# Patient Record
Sex: MALE | Race: WHITE
[De-identification: names, ages, dates, MRNs, and addresses within clinical notes are randomized per-mention and may not be internally consistent; named-entity substitution may affect disease eponyms.]

---

## 2021-12-27 PROBLEM — Z00.00 ENCOUNTER FOR PREVENTIVE HEALTH EXAMINATION: Status: ACTIVE | Noted: 2021-12-27

## 2022-01-04 ENCOUNTER — NON-APPOINTMENT (OUTPATIENT)
Age: 67
End: 2022-01-04

## 2022-01-05 ENCOUNTER — APPOINTMENT (OUTPATIENT)
Dept: HEART AND VASCULAR | Facility: CLINIC | Age: 67
End: 2022-01-05
Payer: COMMERCIAL

## 2022-01-05 ENCOUNTER — NON-APPOINTMENT (OUTPATIENT)
Age: 67
End: 2022-01-05

## 2022-01-05 VITALS
HEIGHT: 72 IN | HEART RATE: 60 BPM | DIASTOLIC BLOOD PRESSURE: 80 MMHG | RESPIRATION RATE: 12 BRPM | BODY MASS INDEX: 28.31 KG/M2 | WEIGHT: 209 LBS | SYSTOLIC BLOOD PRESSURE: 108 MMHG

## 2022-01-05 DIAGNOSIS — R06.02 SHORTNESS OF BREATH: ICD-10-CM

## 2022-01-05 DIAGNOSIS — Z87.19 PERSONAL HISTORY OF OTHER DISEASES OF THE DIGESTIVE SYSTEM: ICD-10-CM

## 2022-01-05 DIAGNOSIS — Z78.9 OTHER SPECIFIED HEALTH STATUS: ICD-10-CM

## 2022-01-05 DIAGNOSIS — Z82.49 FAMILY HISTORY OF ISCHEMIC HEART DISEASE AND OTHER DISEASES OF THE CIRCULATORY SYSTEM: ICD-10-CM

## 2022-01-05 LAB — INR PPP: 1.3 RATIO

## 2022-01-05 PROCEDURE — 93306 TTE W/DOPPLER COMPLETE: CPT

## 2022-01-05 PROCEDURE — 93000 ELECTROCARDIOGRAM COMPLETE: CPT

## 2022-01-05 PROCEDURE — 93880 EXTRACRANIAL BILAT STUDY: CPT

## 2022-01-05 PROCEDURE — ZZZZZ: CPT

## 2022-01-05 PROCEDURE — 99204 OFFICE O/P NEW MOD 45 MIN: CPT | Mod: 25

## 2022-01-05 RX ORDER — AZATHIOPRINE 50 1/1
50 TABLET ORAL
Refills: 0 | Status: DISCONTINUED | COMMUNITY

## 2022-01-05 RX ORDER — METOPROLOL TARTRATE 50 MG/1
50 TABLET, FILM COATED ORAL
Refills: 0 | Status: DISCONTINUED | COMMUNITY

## 2022-01-05 RX ORDER — TACROLIMUS 0.5 MG/1
0.5 CAPSULE ORAL
Refills: 0 | Status: DISCONTINUED | COMMUNITY

## 2022-01-05 NOTE — HISTORY OF PRESENT ILLNESS
[FreeTextEntry1] : 66-year-old male with a past medical history of Osborne status post liver transplant 2004, atrial fibrillation status post ablation comes in to establish care. Overall, feels at baseline denies any chest pain any orthopnea. Does have occasional shortness of breath however does not engage in exercise much.

## 2022-01-05 NOTE — DISCUSSION/SUMMARY
[FreeTextEntry1] : 1. Atrial fibrillation: Patient has had escalating doses of Coumadin without much in the way of an increase in INR as of late. Dosage adjusted Will obtain INR today and advise once results are known. Discussed switching to NOAC specifically eliquis 2.5 mg b.i.d. patient to followup with transplant team to see if compatible with liver transplant. EKG.\par 2. Shortness of breath: Echocardiogram\par 3. Carotid bruit: Carotid duplex\par \par inr subtherapeutic, quick abd us reveals pockets of ascites. likely affecting absorbtion of meds.

## 2022-01-05 NOTE — PHYSICAL EXAM
[Well Developed] : well developed [Well Nourished] : well nourished [No Acute Distress] : no acute distress [Normal Conjunctiva] : normal conjunctiva [Normal Venous Pressure] : normal venous pressure [Carotid Bruit] : carotid bruit [Normal S1, S2] : normal S1, S2 [No Rub] : no rub [No Gallop] : no gallop [Murmur] : murmur [Good Air Entry] : good air entry [No Respiratory Distress] : no respiratory distress  [Soft] : abdomen soft [Non Tender] : non-tender [No Masses/organomegaly] : no masses/organomegaly [Normal Bowel Sounds] : normal bowel sounds [Normal Gait] : normal gait [No Cyanosis] : no cyanosis [No Clubbing] : no clubbing [No Varicosities] : no varicosities [No Rash] : no rash [No Skin Lesions] : no skin lesions [Moves all extremities] : moves all extremities [No Focal Deficits] : no focal deficits [Normal Speech] : normal speech [Alert and Oriented] : alert and oriented [Normal memory] : normal memory [de-identified] : bismark [de-identified] : bibasilar insp crackles [de-identified] : large panus [de-identified] : 1+ b/l le edema

## 2022-01-13 ENCOUNTER — APPOINTMENT (OUTPATIENT)
Dept: HEART AND VASCULAR | Facility: CLINIC | Age: 67
End: 2022-01-13
Payer: COMMERCIAL

## 2022-01-13 ENCOUNTER — LABORATORY RESULT (OUTPATIENT)
Age: 67
End: 2022-01-13

## 2022-01-13 ENCOUNTER — TRANSCRIPTION ENCOUNTER (OUTPATIENT)
Age: 67
End: 2022-01-13

## 2022-01-13 DIAGNOSIS — R09.89 OTHER SPECIFIED SYMPTOMS AND SIGNS INVOLVING THE CIRCULATORY AND RESPIRATORY SYSTEMS: ICD-10-CM

## 2022-01-13 LAB — INR PPP: 2.6 RATIO

## 2022-01-13 PROCEDURE — 85610 PROTHROMBIN TIME: CPT | Mod: QW

## 2022-01-17 LAB
ALBUMIN SERPL ELPH-MCNC: 3.3 G/DL
ALP BLD-CCNC: 146 U/L
ALT SERPL-CCNC: 14 U/L
ANION GAP SERPL CALC-SCNC: 13 MMOL/L
AST SERPL-CCNC: 23 U/L
BASOPHILS # BLD AUTO: 0 K/UL
BASOPHILS NFR BLD AUTO: 0 %
BILIRUB SERPL-MCNC: 0.4 MG/DL
BUN SERPL-MCNC: 42 MG/DL
CALCIUM SERPL-MCNC: 8.6 MG/DL
CHLORIDE SERPL-SCNC: 108 MMOL/L
CHOLEST SERPL-MCNC: 107 MG/DL
CO2 SERPL-SCNC: 20 MMOL/L
CREAT SERPL-MCNC: 2.97 MG/DL
EOSINOPHIL # BLD AUTO: 0.05 K/UL
EOSINOPHIL NFR BLD AUTO: 0.9 %
ESTIMATED AVERAGE GLUCOSE: 94 MG/DL
FOLATE SERPL-MCNC: 7.3 NG/ML
GGT SERPL-CCNC: 65 U/L
GLUCOSE SERPL-MCNC: 97 MG/DL
HBA1C MFR BLD HPLC: 4.9 %
HCT VFR BLD CALC: 30.1 %
HDLC SERPL-MCNC: 39 MG/DL
HGB BLD-MCNC: 9.4 G/DL
LDLC SERPL CALC-MCNC: 46 MG/DL
LYMPHOCYTES # BLD AUTO: 0.5 K/UL
LYMPHOCYTES NFR BLD AUTO: 8.7 %
MAGNESIUM SERPL-MCNC: 1.6 MG/DL
MAN DIFF?: NORMAL
MCHC RBC-ENTMCNC: 31.2 GM/DL
MCHC RBC-ENTMCNC: 36.6 PG
MCV RBC AUTO: 117.1 FL
MONOCYTES # BLD AUTO: 0.25 K/UL
MONOCYTES NFR BLD AUTO: 4.3 %
NEUTROPHILS # BLD AUTO: 4.93 K/UL
NEUTROPHILS NFR BLD AUTO: 85.2 %
NONHDLC SERPL-MCNC: 68 MG/DL
PHOSPHATE SERPL-MCNC: 2.7 MG/DL
PLATELET # BLD AUTO: 176 K/UL
POTASSIUM SERPL-SCNC: 4 MMOL/L
PROT SERPL-MCNC: 6.1 G/DL
RBC # BLD: 2.57 M/UL
RBC # FLD: 17.3 %
SODIUM SERPL-SCNC: 141 MMOL/L
TACROLIMUS SERPL-MCNC: 6.3 NG/ML
TRIGL SERPL-MCNC: 110 MG/DL
VIT B12 SERPL-MCNC: 310 PG/ML
WBC # FLD AUTO: 5.79 K/UL

## 2022-01-26 ENCOUNTER — TRANSCRIPTION ENCOUNTER (OUTPATIENT)
Age: 67
End: 2022-01-26

## 2022-01-26 LAB
INR PPP: 2.49
PT BLD: 28.6

## 2022-02-02 ENCOUNTER — APPOINTMENT (OUTPATIENT)
Dept: HEART AND VASCULAR | Facility: CLINIC | Age: 67
End: 2022-02-02
Payer: COMMERCIAL

## 2022-02-02 PROCEDURE — 99213 OFFICE O/P EST LOW 20 MIN: CPT | Mod: 95

## 2022-02-02 NOTE — PHYSICAL EXAM
[Normal] : normal venous pressure, no carotid bruit [de-identified] : Unable to obtain [de-identified] : Unable to obtain [de-identified] : 1+ b/l le edema

## 2022-02-02 NOTE — DISCUSSION/SUMMARY
[FreeTextEntry1] : 1. Edema: Strongly encouraged to decrease torsemide to no more than once a day and considering in marked improvement in lower extremity edema to decrease it even further to 3 times a week and to obtain stat renal function labs. Patient has a prescription and will attempt to find a local lab to get blood work prescription for torsemide 20 mg daily  with the advice to only take every other day until after blood work results are reviewed.\par 2. Chronic renal insufficiency: Baseline creatinine 2.7-2.9. Advised to follow up with renal\par 3. Atrial fibrillation: Continue Coumadin and obtain INR.

## 2022-02-02 NOTE — HISTORY OF PRESENT ILLNESS
[FreeTextEntry1] : 66-year-old male for past medical history of renal failure status post renal transplant recipient.TH visit obtained as patient is in [place] on vacation. As per patient's wife and patient his lower family began to swell about one week ago. Patient found torsemide he had and began taking 3 times a day with robust urine outputs and significant decrease in bilateral lower extremity edema. One week prior to leaving on vacation patient's INR was 2.5. Currently patient denies any chest pain shortness of breath PND or orthopnea.

## 2022-02-08 ENCOUNTER — TRANSCRIPTION ENCOUNTER (OUTPATIENT)
Age: 67
End: 2022-02-08

## 2022-02-25 LAB — INR PPP: 3.52

## 2022-03-04 ENCOUNTER — RX RENEWAL (OUTPATIENT)
Age: 67
End: 2022-03-04

## 2022-03-14 LAB — INR PPP: 1.86

## 2022-03-22 LAB — INR PPP: 1.66

## 2022-04-05 LAB
INR PPP: 1.85
PT BLD: 21.2

## 2022-04-21 LAB
INR PPP: 4.5
PT BLD: 45

## 2022-04-26 ENCOUNTER — TRANSCRIPTION ENCOUNTER (OUTPATIENT)
Age: 67
End: 2022-04-26

## 2022-05-02 ENCOUNTER — NON-APPOINTMENT (OUTPATIENT)
Age: 67
End: 2022-05-02

## 2022-05-04 ENCOUNTER — APPOINTMENT (OUTPATIENT)
Dept: HEART AND VASCULAR | Facility: CLINIC | Age: 67
End: 2022-05-04
Payer: COMMERCIAL

## 2022-05-04 PROCEDURE — 99213 OFFICE O/P EST LOW 20 MIN: CPT | Mod: 95

## 2022-05-04 NOTE — HISTORY OF PRESENT ILLNESS
[Home] : at home, [unfilled] , at the time of the visit. [Medical Office: ([facility] [place])___] : at the medical office located in  [Spouse] : spouse [Verbal consent obtained from patient] : the patient, [unfilled] [FreeTextEntry1] : 67-year-old male with chronic renal failure atrial fibrillation anticoagulation. TH visit obtained to discuss recent hospitalization for hypokalemia. Patient was referred to the hospital for IV supplementation of potassium for low potassium despite oral replacement. Overall, feels well denies chest pain shortness of breath PND orthopnea. Of note, patient tested positive for corona virus shortly after discharge from the hospital.

## 2022-05-04 NOTE — DISCUSSION/SUMMARY
[FreeTextEntry1] : 1. Hypokalemia: Recent bloodwork reviewed potassium up to 3.2 creatinine 3.4 which is his baseline. Advised to continue p.o. KCl 20 mEq daily along with 400 mg of magnesium. Will repeat blood work in one week and advised with results unknown.\par 2. Atrial fibrillation: Patient with supratherapeutic INR after adjusting medication repeat INR 2.2 advised to continue current regimen of Coumadin

## 2022-05-11 ENCOUNTER — APPOINTMENT (OUTPATIENT)
Dept: HEART AND VASCULAR | Facility: CLINIC | Age: 67
End: 2022-05-11
Payer: COMMERCIAL

## 2022-05-11 DIAGNOSIS — R05.9 COUGH, UNSPECIFIED: ICD-10-CM

## 2022-05-11 PROCEDURE — 99213 OFFICE O/P EST LOW 20 MIN: CPT | Mod: 95

## 2022-05-11 NOTE — REASON FOR VISIT
[Home] : at home, [unfilled] , at the time of the visit. [Medical Office: ([facility])___] : at the medical office located in  [Spouse] : spouse [Verbal consent obtained from patient] : the patient, [unfilled] [Other: ____] : [unfilled] [FreeTextEntry1] : 68 y/o m with an extensive pmhx. TH visit obtained to discuss hypokalemia. recent bw revealed potassium of 3.0, despite mag and 20meq of daily kcl. denies cp, sob, pnd orthopnea. pt does complain of cry cough as it relates to recent covid diagnosis.

## 2022-05-11 NOTE — DISCUSSION/SUMMARY
[FreeTextEntry1] : 1.hypokalemia: advised to increase kcl to 40meq daily and repeat bmp in 1 week. \par 2. 2.cough: promethazine with codeine sent advised t ocome in symptoms worsen.

## 2022-05-18 ENCOUNTER — APPOINTMENT (OUTPATIENT)
Dept: HEART AND VASCULAR | Facility: CLINIC | Age: 67
End: 2022-05-18
Payer: COMMERCIAL

## 2022-05-18 PROCEDURE — 99213 OFFICE O/P EST LOW 20 MIN: CPT | Mod: 95

## 2022-05-18 RX ORDER — OMEGA-3/DHA/EPA/FISH OIL 300-1000MG
400 CAPSULE ORAL
Qty: 30 | Refills: 0 | Status: DISCONTINUED | COMMUNITY
Start: 2022-04-29 | End: 2022-05-18

## 2022-05-18 NOTE — DISCUSSION/SUMMARY
[FreeTextEntry1] : 1. Hypokalemia: Advised to decrease potassium chloride to 40 mEq 3 days a week and 20 mEq 4 days a week. Will revisit a basic metabolic panel in one week.\par 2. Atrial fibrillation: INR 2.0 advised to continue warfarin and metoprolol.

## 2022-05-18 NOTE — HISTORY OF PRESENT ILLNESS
[Home] : at home, [unfilled] , at the time of the visit. [Medical Office: ([facility])___] : at the medical office located in  [Spouse] : spouse [Verbal consent obtained from patient] : the patient, [unfilled] [FreeTextEntry1] : 67-year-old male with an extensive past medical history including hypokalemia and atrial fibrillation status post ablation on anticoagulation. TH visit obtained to assess recent basic metabolic panel results. Potassium up to 4.4. Patient reports feeling one transient episode of palpitations with a heart rate of 188 back to normal within 1-2 minutes. Denies any cp shortness of breath PND or orthopnea.

## 2022-05-26 ENCOUNTER — APPOINTMENT (OUTPATIENT)
Dept: HEART AND VASCULAR | Facility: CLINIC | Age: 67
End: 2022-05-26
Payer: COMMERCIAL

## 2022-05-26 PROCEDURE — 99213 OFFICE O/P EST LOW 20 MIN: CPT | Mod: 95

## 2022-05-26 NOTE — DISCUSSION/SUMMARY
[FreeTextEntry1] : 1. Hypokalemia: Advised to decrease potassium to 20 mEq daily. Repeat basic metabolic panel one week\par 2. Atrial fibrillation: Increase Coumadin to 7.5 mg 2 days a week and 5 mg 5 days a week.

## 2022-05-26 NOTE — HISTORY OF PRESENT ILLNESS
[Home] : at home, [unfilled] , at the time of the visit. [Medical Office: ([facility])___] : at the medical office located in  [Spouse] : spouse [Verbal consent obtained from patient] : the patient, [unfilled] [FreeTextEntry1] : 67-year-old male with an extensive past medical history. TH to discuss results of recent laboratory at potassium 5.0 INR 1.66. Overall, feels well.

## 2022-06-09 ENCOUNTER — NON-APPOINTMENT (OUTPATIENT)
Age: 67
End: 2022-06-09

## 2022-06-09 ENCOUNTER — LABORATORY RESULT (OUTPATIENT)
Age: 67
End: 2022-06-09

## 2022-06-09 ENCOUNTER — APPOINTMENT (OUTPATIENT)
Dept: HEART AND VASCULAR | Facility: CLINIC | Age: 67
End: 2022-06-09
Payer: COMMERCIAL

## 2022-06-09 VITALS
WEIGHT: 224 LBS | RESPIRATION RATE: 12 BRPM | HEART RATE: 64 BPM | SYSTOLIC BLOOD PRESSURE: 102 MMHG | HEIGHT: 72 IN | DIASTOLIC BLOOD PRESSURE: 70 MMHG | BODY MASS INDEX: 30.34 KG/M2

## 2022-06-09 DIAGNOSIS — N18.31 CHRONIC KIDNEY DISEASE, STAGE 3A: ICD-10-CM

## 2022-06-09 DIAGNOSIS — R60.0 LOCALIZED EDEMA: ICD-10-CM

## 2022-06-09 DIAGNOSIS — K75.81 NONALCOHOLIC STEATOHEPATITIS (NASH): ICD-10-CM

## 2022-06-09 PROCEDURE — 99214 OFFICE O/P EST MOD 30 MIN: CPT

## 2022-06-09 PROCEDURE — 93000 ELECTROCARDIOGRAM COMPLETE: CPT

## 2022-06-09 RX ORDER — ZOLPIDEM TARTRATE 10 MG/1
10 TABLET, FILM COATED ORAL
Refills: 0 | Status: DISCONTINUED | COMMUNITY
End: 2022-06-09

## 2022-06-09 NOTE — DISCUSSION/SUMMARY
[FreeTextEntry1] : 1. Atrial fibrillation: Continue metoprolol and Coumadin\par 2. Hypokalemia: Followup chemistry\par 3.LEWIS: Resulting in abdominal ascites. Patient follows regularly with transplant team had an ultrasound done at [facility] which revealed large volume ascites. Suggested a history of chronic renal insufficiency and hypokalemia to start Aldactone 25 mg daily and reduce torsemide 10 mg daily. However before and mentation of this plan advised to follow up with GI and transplant team.\par 4. Chronic renal insufficiency: Renal followup\par 5. Edema lower extremity: Continue torsemide.

## 2022-06-09 NOTE — PHYSICAL EXAM
[Well Developed] : well developed [Well Nourished] : well nourished [No Acute Distress] : no acute distress [Normal Conjunctiva] : normal conjunctiva [Normal Venous Pressure] : normal venous pressure [No Carotid Bruit] : no carotid bruit [Normal S1, S2] : normal S1, S2 [No Murmur] : no murmur [No Rub] : no rub [No Gallop] : no gallop [Clear Lung Fields] : clear lung fields [Good Air Entry] : good air entry [No Respiratory Distress] : no respiratory distress  [Soft] : abdomen soft [Non Tender] : non-tender [Normal Bowel Sounds] : normal bowel sounds [Normal Gait] : normal gait [No Cyanosis] : no cyanosis [No Clubbing] : no clubbing [No Varicosities] : no varicosities [No Rash] : no rash [No Skin Lesions] : no skin lesions [Moves all extremities] : moves all extremities [No Focal Deficits] : no focal deficits [Normal Speech] : normal speech [Alert and Oriented] : alert and oriented [Normal memory] : normal memory [de-identified] : pale appearing  [de-identified] : abdominsl distention and ascites [de-identified] : 1+ b/l le edema

## 2022-06-09 NOTE — HISTORY OF PRESENT ILLNESS
[FreeTextEntry1] : 67-year-old male status post liver transplant secondary to LEWIS, afib crf, comes in for f/u. overall feeling better recovering from covid. has been more sob when lying down. while in the hodpital for hypokalemia he had an echo done.

## 2022-06-10 LAB
25(OH)D3 SERPL-MCNC: 45.8 NG/ML
ALBUMIN SERPL ELPH-MCNC: 3 G/DL
ALP BLD-CCNC: 103 U/L
ALT SERPL-CCNC: 5 U/L
ANION GAP SERPL CALC-SCNC: 13 MMOL/L
AST SERPL-CCNC: 12 U/L
BILIRUB SERPL-MCNC: 0.4 MG/DL
BUN SERPL-MCNC: 44 MG/DL
CALCIUM SERPL-MCNC: 8.3 MG/DL
CHLORIDE SERPL-SCNC: 106 MMOL/L
CHOLEST SERPL-MCNC: 94 MG/DL
CO2 SERPL-SCNC: 19 MMOL/L
CREAT SERPL-MCNC: 3.62 MG/DL
EGFR: 18 ML/MIN/1.73M2
FOLATE SERPL-MCNC: 5.9 NG/ML
GGT SERPL-CCNC: 21 U/L
GLUCOSE SERPL-MCNC: 85 MG/DL
HDLC SERPL-MCNC: 45 MG/DL
LDH SERPL-CCNC: 184 U/L
LDLC SERPL CALC-MCNC: 38 MG/DL
MAGNESIUM SERPL-MCNC: 1.5 MG/DL
NONHDLC SERPL-MCNC: 48 MG/DL
PHOSPHATE SERPL-MCNC: 3.5 MG/DL
POTASSIUM SERPL-SCNC: 4.8 MMOL/L
PROT SERPL-MCNC: 5.9 G/DL
PSA SERPL-MCNC: 0.67 NG/ML
SODIUM SERPL-SCNC: 138 MMOL/L
T3FREE SERPL-MCNC: 2.11 PG/ML
T4 FREE SERPL-MCNC: 1 NG/DL
T4 SERPL-MCNC: 5.4 UG/DL
TACROLIMUS SERPL-MCNC: 6.9 NG/ML
TRIGL SERPL-MCNC: 53 MG/DL
TSH SERPL-ACNC: 9.9 UIU/ML
VIT B12 SERPL-MCNC: 368 PG/ML

## 2022-06-13 LAB
BASOPHILS # BLD AUTO: 0.01 K/UL
BASOPHILS NFR BLD AUTO: 0.3 %
EOSINOPHIL # BLD AUTO: 0.14 K/UL
EOSINOPHIL NFR BLD AUTO: 3.6 %
ESTIMATED AVERAGE GLUCOSE: 105 MG/DL
HBA1C MFR BLD HPLC: 5.3 %
HCT VFR BLD CALC: 22.4 %
HGB BLD-MCNC: 7 G/DL
IMM GRANULOCYTES NFR BLD AUTO: 0.3 %
LYMPHOCYTES # BLD AUTO: 0.34 K/UL
LYMPHOCYTES NFR BLD AUTO: 8.8 %
MAN DIFF?: NORMAL
MCHC RBC-ENTMCNC: 31.3 GM/DL
MCHC RBC-ENTMCNC: 36.1 PG
MCV RBC AUTO: 115.5 FL
MONOCYTES # BLD AUTO: 0.38 K/UL
MONOCYTES NFR BLD AUTO: 9.8 %
NEUTROPHILS # BLD AUTO: 2.98 K/UL
NEUTROPHILS NFR BLD AUTO: 77.2 %
PLATELET # BLD AUTO: 263 K/UL
RBC # BLD: 1.94 M/UL
RBC # FLD: 18.6 %
WBC # FLD AUTO: 3.86 K/UL

## 2022-06-14 ENCOUNTER — TRANSCRIPTION ENCOUNTER (OUTPATIENT)
Age: 67
End: 2022-06-14

## 2022-07-07 ENCOUNTER — APPOINTMENT (OUTPATIENT)
Dept: HEART AND VASCULAR | Facility: CLINIC | Age: 67
End: 2022-07-07

## 2022-07-07 PROCEDURE — 99213 OFFICE O/P EST LOW 20 MIN: CPT | Mod: 95

## 2022-07-07 RX ORDER — METOPROLOL SUCCINATE 50 MG/1
50 TABLET, EXTENDED RELEASE ORAL
Qty: 90 | Refills: 3 | Status: DISCONTINUED | COMMUNITY
Start: 1900-01-01 | End: 2022-07-07

## 2022-07-07 NOTE — DISCUSSION/SUMMARY
[FreeTextEntry1] : 1. Atrial fibrillation: Continue anticoagulation and metoprolol.\par 2. Hypothyroidism: Continue Synthroid most recent blood work reviewed TSH improved to 5.6 from 9.9 and advised to continue current dose of Synthroid prescription sent to the pharmacy.\par 3. Hypokalemia: Creatinine 4.7 which is stable potassium 3.6 advised to continue 10 mEq 4 times a week until suggested otherwise by nephrology.

## 2022-07-07 NOTE — REASON FOR VISIT
[Home] : at home, [unfilled] , at the time of the visit. [Medical Office: ([facility])___] : at the medical office located in  [Spouse] : spouse [Patient] : the patient [This encounter was initiated by telehealth (audio with video) and converted to telephone (audio only) due to technical difficulties.] : This encounter was initiated by telehealth (audio with video) and converted to telephone (audio only) due to technical difficulties. [FreeTextEntry1] : 67-year-old male with a past medical history of atrial fibrillation status post liver transplant hypokalemia chronic renal failure abdominal ascites. TH obtained to discuss most recent hospitalization. Patient was rehospitalized for worsening renal function worsening abdominal distention and ascites. Patient underwent a paracentesis with removal of 3 L of fluid. Patient had 8 L in his abdomen however concern for removing any more fluid may cause worsening renal failure. Patient currently following up with renal and liver transplant team to determine cause of what appears to be hepatorenal syndrome. Denies any chest pain shortness of breath PND or orthopnea.

## 2022-07-07 NOTE — PHYSICAL EXAM
[Well Developed] : well developed [Well Nourished] : well nourished [No Acute Distress] : no acute distress [Normal Conjunctiva] : normal conjunctiva [No Respiratory Distress] : no respiratory distress  [Normal Gait] : normal gait [No Edema] : no edema [Moves all extremities] : moves all extremities [No Rash] : no rash [No Focal Deficits] : no focal deficits [Alert and Oriented] : alert and oriented [de-identified] : Unable to obtain [de-identified] : Abdominal distention

## 2022-07-18 ENCOUNTER — APPOINTMENT (OUTPATIENT)
Dept: HEART AND VASCULAR | Facility: CLINIC | Age: 67
End: 2022-07-18

## 2022-07-18 DIAGNOSIS — E87.6 HYPOKALEMIA: ICD-10-CM

## 2022-07-18 PROCEDURE — 99213 OFFICE O/P EST LOW 20 MIN: CPT | Mod: 95

## 2022-07-18 RX ORDER — POTASSIUM CHLORIDE 1500 MG/1
20 TABLET, EXTENDED RELEASE ORAL
Qty: 30 | Refills: 0 | Status: DISCONTINUED | COMMUNITY
Start: 2022-04-29

## 2022-07-18 RX ORDER — POTASSIUM CHLORIDE 750 MG/1
10 TABLET, EXTENDED RELEASE ORAL
Qty: 720 | Refills: 0 | Status: DISCONTINUED | COMMUNITY
Start: 2021-06-21

## 2022-07-18 RX ORDER — BENZONATATE 100 MG/1
100 CAPSULE ORAL
Qty: 45 | Refills: 0 | Status: DISCONTINUED | COMMUNITY
Start: 2022-05-04

## 2022-07-18 RX ORDER — OMEPRAZOLE 20 MG/1
20 TABLET, DELAYED RELEASE ORAL
Qty: 90 | Refills: 4 | Status: DISCONTINUED | COMMUNITY
Start: 1900-01-01 | End: 2022-07-18

## 2022-07-18 RX ORDER — POTASSIUM CHLORIDE 10 MEQ
10 CAPSULE, EXTENDED RELEASE ORAL
Refills: 0 | Status: DISCONTINUED | COMMUNITY
End: 2022-07-18

## 2022-07-18 NOTE — DISCUSSION/SUMMARY
[FreeTextEntry1] : 1. Atrial fibrillation: Continue metoprolol and warfarin\par 2. Hypothyroidism: Continue Synthroid\par 3. Hypokalemia: In the setting of hepatorenal syndrome. 25 mg of Aldactone prescribed after consultation with renal. Will repeat basic metabolic panel in 10 days advised in the interim to discontinue oral potassium replacement.\par \par Zofran prescribed for nausea.

## 2022-07-18 NOTE — REVIEW OF SYSTEMS
[Cough] : no cough [Negative] : Heme/Lymph [FreeTextEntry5] : see hpi [FreeTextEntry7] : See history of present illness

## 2022-07-18 NOTE — PHYSICAL EXAM
[Well Developed] : well developed [Well Nourished] : well nourished [No Acute Distress] : no acute distress [Normal Conjunctiva] : normal conjunctiva [No Respiratory Distress] : no respiratory distress  [Normal Gait] : normal gait [No Edema] : no edema [No Rash] : no rash [Moves all extremities] : moves all extremities [No Focal Deficits] : no focal deficits [Alert and Oriented] : alert and oriented [de-identified] : Unable to obtain [de-identified] : Abdominal distention Slightly less

## 2022-07-18 NOTE — HISTORY OF PRESENT ILLNESS
[Home] : at home, [unfilled] , at the time of the visit. [Medical Office: ([facility])___] : at the medical office located in  [Spouse] : spouse [Verbal consent obtained from patient] : the patient, [unfilled] [FreeTextEntry1] : 67-year-old male with an extensive past medical history including liver transplant, now with hepatorenal syndrome next is atrial fibrillation hypothyroidism. Patient recently status post 8 L paracentesis with IV albumin supplementation. Has been feeling better also received one dose of Procrit. However, does feel early satiety. Denies any chest pain shortness of breath PND or orthopnea.

## 2022-08-24 ENCOUNTER — NON-APPOINTMENT (OUTPATIENT)
Age: 67
End: 2022-08-24

## 2022-08-25 LAB — INR PPP: 2.44

## 2022-09-01 ENCOUNTER — NON-APPOINTMENT (OUTPATIENT)
Age: 67
End: 2022-09-01

## 2022-09-01 ENCOUNTER — APPOINTMENT (OUTPATIENT)
Dept: HEART AND VASCULAR | Facility: CLINIC | Age: 67
End: 2022-09-01
Payer: COMMERCIAL

## 2022-09-01 VITALS
BODY MASS INDEX: 29.8 KG/M2 | HEIGHT: 72 IN | HEART RATE: 60 BPM | RESPIRATION RATE: 12 BRPM | WEIGHT: 220 LBS | SYSTOLIC BLOOD PRESSURE: 114 MMHG | DIASTOLIC BLOOD PRESSURE: 78 MMHG

## 2022-09-01 PROCEDURE — 93000 ELECTROCARDIOGRAM COMPLETE: CPT

## 2022-09-01 PROCEDURE — 99214 OFFICE O/P EST MOD 30 MIN: CPT | Mod: 25

## 2022-09-01 RX ORDER — PROMETHAZINE HYDROCHLORIDE AND CODEINE PHOSPHATE 6.25; 1 MG/5ML; MG/5ML
6.25-1 SOLUTION ORAL
Qty: 300 | Refills: 0 | Status: DISCONTINUED | COMMUNITY
Start: 2022-05-11 | End: 2022-09-01

## 2022-09-01 RX ORDER — ONDANSETRON 8 MG/1
8 TABLET ORAL
Qty: 60 | Refills: 3 | Status: DISCONTINUED | COMMUNITY
Start: 2022-07-18 | End: 2022-09-01

## 2022-09-01 NOTE — DISCUSSION/SUMMARY
[FreeTextEntry1] : 1. Atrial fibrillation: EKG; reviewed recent blood work within acceptable limits. Will place a 3 day Holter monitor to assess for recurrent atrial fibrillation or atrial flutter for now continue metoprolol as prescribed\par 2. Hypothyroidism: Continue Synthroid [EKG obtained to assist in diagnosis and management of assessed problem(s)] : EKG obtained to assist in diagnosis and management of assessed problem(s)

## 2022-09-01 NOTE — PHYSICAL EXAM

## 2022-09-01 NOTE — HISTORY OF PRESENT ILLNESS
[FreeTextEntry1] : 67-year-old male with a past medical history of atrial fibrillation status post ablation, liver transplant chronic renal insufficiency and anemia comes in for followup and evaluation of palpitations. Patient currently under the care of GI and renal. Underwent a liver biopsy which was essentially stage I liver disease. Renal disease believed to be secondary to chronic use of antirejection medications over 18 years. As of late since been having fleeting episodes of palpitations associated with shortness of breath usually self-limited occurs once every few days. Denies any chest pain PND or orthopnea.

## 2022-09-13 ENCOUNTER — NON-APPOINTMENT (OUTPATIENT)
Age: 67
End: 2022-09-13

## 2022-09-19 DIAGNOSIS — R79.89 OTHER SPECIFIED ABNORMAL FINDINGS OF BLOOD CHEMISTRY: ICD-10-CM

## 2022-09-22 LAB
INR PPP: 1.93
INR PPP: 3.7

## 2022-11-08 ENCOUNTER — APPOINTMENT (OUTPATIENT)
Dept: HEART AND VASCULAR | Facility: CLINIC | Age: 67
End: 2022-11-08

## 2022-11-08 VITALS
SYSTOLIC BLOOD PRESSURE: 110 MMHG | DIASTOLIC BLOOD PRESSURE: 70 MMHG | WEIGHT: 198 LBS | HEART RATE: 80 BPM | BODY MASS INDEX: 26.82 KG/M2 | HEIGHT: 72 IN | RESPIRATION RATE: 12 BRPM

## 2022-11-08 DIAGNOSIS — Z86.39 PERSONAL HISTORY OF OTHER ENDOCRINE, NUTRITIONAL AND METABOLIC DISEASE: ICD-10-CM

## 2022-11-08 PROCEDURE — 99214 OFFICE O/P EST MOD 30 MIN: CPT | Mod: 25

## 2022-11-08 PROCEDURE — 93000 ELECTROCARDIOGRAM COMPLETE: CPT

## 2022-11-08 RX ORDER — EPOETIN ALFA-EPBX 20000 [IU]/ML
20000 INJECTION, SOLUTION INTRAVENOUS; SUBCUTANEOUS
Qty: 4 | Refills: 0 | Status: DISCONTINUED | COMMUNITY
Start: 2022-07-25

## 2022-11-08 RX ORDER — ASPIRIN ENTERIC COATED TABLETS 81 MG 81 MG/1
81 TABLET, DELAYED RELEASE ORAL
Refills: 0 | Status: ACTIVE | COMMUNITY

## 2022-11-08 RX ORDER — ZOLPIDEM TARTRATE 12.5 MG/1
12.5 TABLET, EXTENDED RELEASE ORAL
Qty: 90 | Refills: 1 | Status: DISCONTINUED | COMMUNITY
Start: 2022-06-09 | End: 2022-11-08

## 2022-11-08 RX ORDER — SERTRALINE HYDROCHLORIDE 50 MG/1
50 TABLET, FILM COATED ORAL
Refills: 0 | Status: DISCONTINUED | COMMUNITY
End: 2022-11-08

## 2022-11-08 RX ORDER — AZELASTINE HYDROCHLORIDE 0.5 MG/ML
0.05 SOLUTION/ DROPS OPHTHALMIC
Qty: 6 | Refills: 0 | Status: DISCONTINUED | COMMUNITY
Start: 2021-08-09 | End: 2022-11-08

## 2022-11-08 RX ORDER — CHOLESTYRAMINE 4 G/9G
4 POWDER, FOR SUSPENSION ORAL
Qty: 60 | Refills: 0 | Status: DISCONTINUED | COMMUNITY
Start: 2022-09-29

## 2022-11-08 RX ORDER — MIRTAZAPINE 15 MG/1
15 TABLET, FILM COATED ORAL
Qty: 30 | Refills: 0 | Status: DISCONTINUED | COMMUNITY
Start: 2022-11-04

## 2022-11-08 RX ORDER — ESOMEPRAZOLE MAGNESIUM 40 MG/1
40 CAPSULE, DELAYED RELEASE ORAL
Qty: 180 | Refills: 0 | Status: DISCONTINUED | COMMUNITY
Start: 2021-10-13 | End: 2022-11-08

## 2022-11-08 RX ORDER — AZATHIOPRINE 50 1/1
50 TABLET ORAL 3 TIMES DAILY
Refills: 0 | Status: DISCONTINUED | COMMUNITY
End: 2022-11-08

## 2022-11-08 RX ORDER — ONDANSETRON 4 MG/1
4 TABLET, ORALLY DISINTEGRATING ORAL
Qty: 15 | Refills: 0 | Status: DISCONTINUED | COMMUNITY
Start: 2022-11-04

## 2022-11-08 RX ORDER — MELATONIN 3 MG
3 CAPSULE ORAL
Refills: 0 | Status: ACTIVE | COMMUNITY

## 2022-11-08 RX ORDER — OLANZAPINE 2.5 MG/1
2.5 TABLET, FILM COATED ORAL
Qty: 30 | Refills: 0 | Status: DISCONTINUED | COMMUNITY
Start: 2022-11-04

## 2022-11-08 RX ORDER — RIFAXIMIN 550 MG/1
550 TABLET ORAL
Qty: 60 | Refills: 0 | Status: DISCONTINUED | COMMUNITY
Start: 2022-11-04

## 2022-11-08 RX ORDER — TORSEMIDE 20 MG/1
20 TABLET ORAL
Qty: 30 | Refills: 3 | Status: DISCONTINUED | COMMUNITY
Start: 2022-02-02 | End: 2022-11-08

## 2022-11-08 RX ORDER — WARFARIN 7.5 MG/1
7.5 TABLET ORAL
Qty: 90 | Refills: 3 | Status: DISCONTINUED | COMMUNITY
Start: 1900-01-01 | End: 2022-11-08

## 2022-11-08 RX ORDER — SERTRALINE HYDROCHLORIDE 100 MG/1
100 TABLET, FILM COATED ORAL
Qty: 30 | Refills: 0 | Status: DISCONTINUED | COMMUNITY
Start: 2022-11-04

## 2022-11-08 RX ORDER — METOPROLOL SUCCINATE 25 MG/1
25 TABLET, EXTENDED RELEASE ORAL
Qty: 90 | Refills: 3 | Status: DISCONTINUED | COMMUNITY
Start: 2022-07-01 | End: 2022-11-08

## 2022-11-08 RX ORDER — TESTOSTERONE 50 MG/5G
1 GEL TRANSDERMAL
Refills: 0 | Status: ACTIVE | COMMUNITY

## 2022-11-08 RX ORDER — LOPERAMIDE HYDROCHLORIDE 2 MG/1
2 CAPSULE ORAL
Refills: 0 | Status: DISCONTINUED | COMMUNITY
End: 2022-11-08

## 2022-11-08 RX ORDER — TACROLIMUS 1 MG/1
1 CAPSULE ORAL
Refills: 0 | Status: ACTIVE | COMMUNITY

## 2022-11-08 RX ORDER — DIAPER,BRIEF,INFANT-TODD,DISP
EACH MISCELLANEOUS
Refills: 0 | Status: DISCONTINUED | COMMUNITY
End: 2022-11-08

## 2022-11-08 RX ORDER — WARFARIN 5 MG/1
5 TABLET ORAL
Qty: 90 | Refills: 3 | Status: DISCONTINUED | COMMUNITY
Start: 2022-02-10 | End: 2022-11-08

## 2022-11-08 RX ORDER — ALPRAZOLAM 0.25 MG/1
0.25 TABLET ORAL
Refills: 0 | Status: DISCONTINUED | COMMUNITY
End: 2022-11-08

## 2022-11-08 RX ORDER — LEVOTHYROXINE SODIUM 0.03 MG/1
25 TABLET ORAL DAILY
Qty: 90 | Refills: 3 | Status: DISCONTINUED | COMMUNITY
Start: 2022-06-10 | End: 2022-11-08

## 2022-11-08 RX ORDER — METOPROLOL TARTRATE 25 MG/1
25 TABLET, FILM COATED ORAL
Qty: 90 | Refills: 0 | Status: DISCONTINUED | COMMUNITY
Start: 2022-11-04

## 2022-11-08 RX ORDER — TACROLIMUS 0.5 MG/1
0.5 CAPSULE ORAL
Refills: 0 | Status: DISCONTINUED | COMMUNITY
End: 2022-11-08

## 2022-11-08 RX ORDER — DRONABINOL 2.5 MG/1
2.5 CAPSULE ORAL
Refills: 0 | Status: DISCONTINUED | COMMUNITY
End: 2022-11-08

## 2022-11-08 RX ORDER — FERROUS SULFATE 325(65) MG
325 TABLET ORAL
Refills: 0 | Status: DISCONTINUED | COMMUNITY
End: 2022-11-08

## 2022-11-08 RX ORDER — SODIUM BICARBONATE 650 MG/1
650 TABLET ORAL
Refills: 0 | Status: ACTIVE | COMMUNITY

## 2022-11-08 RX ORDER — SPIRONOLACTONE 50 MG/1
50 TABLET ORAL
Qty: 90 | Refills: 1 | Status: DISCONTINUED | COMMUNITY
Start: 2022-07-18 | End: 2022-11-08

## 2022-11-17 NOTE — HISTORY OF PRESENT ILLNESS
[FreeTextEntry1] : 67-year-old male with a past medical history of liver transplant, end-stage renal disease on hemodialysis and atrial fibrillation comes in accompanied by his wife after a 3-1/2-week hospitalization for commencement of dialysis and worsening ascites.  He underwent extensive paracentesis and commencement of dialysis.  Underwent work-up for liver/renal transplant including a cardiac cath with nonobstructive CAD.  Was discharged on a whole host of medication all of which has been reconciliate it today.  Overall, feels well denies chest pain shortness of breath PND orthopnea.  Is however having profuse diarrhea likely secondary to medication.

## 2022-11-17 NOTE — DISCUSSION/SUMMARY
[EKG obtained to assist in diagnosis and management of assessed problem(s)] : EKG obtained to assist in diagnosis and management of assessed problem(s) [FreeTextEntry1] : 1.  Atrial fibrillation: For now continue current regimen of medication including amiodarone and Eliquis.  EKG. continue pt\par 2.  Hypothyroidism: Continue Synthroid

## 2022-11-17 NOTE — REVIEW OF SYSTEMS
[Negative] : Heme/Lymph [Cough] : no cough [FreeTextEntry5] : see hpi [FreeTextEntry7] : See history of present illness

## 2022-11-17 NOTE — PHYSICAL EXAM

## 2022-11-23 ENCOUNTER — RX RENEWAL (OUTPATIENT)
Age: 67
End: 2022-11-23

## 2022-12-06 ENCOUNTER — TRANSCRIPTION ENCOUNTER (OUTPATIENT)
Age: 67
End: 2022-12-06

## 2022-12-13 ENCOUNTER — TRANSCRIPTION ENCOUNTER (OUTPATIENT)
Age: 67
End: 2022-12-13

## 2022-12-15 ENCOUNTER — NON-APPOINTMENT (OUTPATIENT)
Age: 67
End: 2022-12-15

## 2022-12-21 ENCOUNTER — APPOINTMENT (OUTPATIENT)
Dept: HEART AND VASCULAR | Facility: CLINIC | Age: 67
End: 2022-12-21

## 2022-12-23 ENCOUNTER — NON-APPOINTMENT (OUTPATIENT)
Age: 67
End: 2022-12-23

## 2023-01-03 ENCOUNTER — TRANSCRIPTION ENCOUNTER (OUTPATIENT)
Age: 68
End: 2023-01-03

## 2023-01-04 ENCOUNTER — TRANSCRIPTION ENCOUNTER (OUTPATIENT)
Age: 68
End: 2023-01-04

## 2023-02-01 ENCOUNTER — TRANSCRIPTION ENCOUNTER (OUTPATIENT)
Age: 68
End: 2023-02-01

## 2023-02-27 ENCOUNTER — TRANSCRIPTION ENCOUNTER (OUTPATIENT)
Age: 68
End: 2023-02-27

## 2023-02-27 DIAGNOSIS — R19.7 DIARRHEA, UNSPECIFIED: ICD-10-CM

## 2023-02-28 ENCOUNTER — TRANSCRIPTION ENCOUNTER (OUTPATIENT)
Age: 68
End: 2023-02-28

## 2023-03-03 ENCOUNTER — TRANSCRIPTION ENCOUNTER (OUTPATIENT)
Age: 68
End: 2023-03-03

## 2023-03-03 RX ORDER — LOPERAMIDE HYDROCHLORIDE 1 MG/7.5ML
1 SOLUTION ORAL
Qty: 1 | Refills: 3 | Status: DISCONTINUED | COMMUNITY
Start: 2023-02-27 | End: 2023-03-03

## 2023-03-15 RX ORDER — LOPERAMIDE HYDROCHLORIDE 2 MG/1
2 TABLET ORAL
Qty: 180 | Refills: 3 | Status: ACTIVE | COMMUNITY
Start: 2023-03-03 | End: 1900-01-01

## 2023-04-20 ENCOUNTER — NON-APPOINTMENT (OUTPATIENT)
Age: 68
End: 2023-04-20

## 2023-04-20 ENCOUNTER — APPOINTMENT (OUTPATIENT)
Dept: HEART AND VASCULAR | Facility: CLINIC | Age: 68
End: 2023-04-20
Payer: MEDICARE

## 2023-04-20 VITALS
RESPIRATION RATE: 12 BRPM | DIASTOLIC BLOOD PRESSURE: 40 MMHG | WEIGHT: 186 LBS | HEART RATE: 120 BPM | BODY MASS INDEX: 25.23 KG/M2 | SYSTOLIC BLOOD PRESSURE: 80 MMHG

## 2023-04-20 DIAGNOSIS — I48.91 UNSPECIFIED ATRIAL FIBRILLATION: ICD-10-CM

## 2023-04-20 PROCEDURE — 93000 ELECTROCARDIOGRAM COMPLETE: CPT

## 2023-04-20 PROCEDURE — 99214 OFFICE O/P EST MOD 30 MIN: CPT

## 2023-04-20 PROCEDURE — 93306 TTE W/DOPPLER COMPLETE: CPT

## 2023-04-20 RX ORDER — APIXABAN 5 MG/1
5 TABLET, FILM COATED ORAL
Qty: 180 | Refills: 3 | Status: DISCONTINUED | COMMUNITY
Start: 1900-01-01 | End: 2023-04-20

## 2023-04-20 RX ORDER — LEVOTHYROXINE SODIUM 0.03 MG/1
25 TABLET ORAL DAILY
Qty: 90 | Refills: 3 | Status: ACTIVE | COMMUNITY
Start: 1900-01-01 | End: 1900-01-01

## 2023-04-20 RX ORDER — WARFARIN 5 MG/1
5 TABLET ORAL
Refills: 0 | Status: ACTIVE | COMMUNITY

## 2023-04-20 RX ORDER — OLANZAPINE 5 MG/1
5 TABLET, FILM COATED ORAL DAILY
Qty: 90 | Refills: 3 | Status: ACTIVE | COMMUNITY
Start: 2022-11-08 | End: 1900-01-01

## 2023-04-20 RX ORDER — DIPHENOXYLATE HYDROCHLORIDE AND ATROPINE SULFATE 2.5; .025 MG/1; MG/1
2.5-0.025 TABLET ORAL 4 TIMES DAILY
Qty: 360 | Refills: 1 | Status: ACTIVE | COMMUNITY
Start: 2023-04-20 | End: 1900-01-01

## 2023-04-20 RX ORDER — ONDANSETRON HYDROCHLORIDE 4 MG/1
4 TABLET, FILM COATED ORAL
Refills: 0 | Status: DISCONTINUED | COMMUNITY
End: 2023-04-20

## 2023-04-20 RX ORDER — MIRTAZAPINE 15 MG/1
15 TABLET, FILM COATED ORAL
Qty: 90 | Refills: 3 | Status: DISCONTINUED | COMMUNITY
Start: 1900-01-01 | End: 2023-04-20

## 2023-04-20 RX ORDER — CHROMIUM 200 MCG
800 TABLET ORAL
Refills: 0 | Status: ACTIVE | COMMUNITY

## 2023-04-20 RX ORDER — DRONABINOL 2.5 MG/1
2.5 CAPSULE ORAL
Refills: 0 | Status: ACTIVE | COMMUNITY

## 2023-04-20 RX ORDER — METOPROLOL TARTRATE 25 MG/1
25 TABLET, FILM COATED ORAL
Qty: 270 | Refills: 3 | Status: ACTIVE | COMMUNITY
Start: 1900-01-01 | End: 1900-01-01

## 2023-04-20 RX ORDER — AMIODARONE HYDROCHLORIDE 100 MG/1
100 TABLET ORAL
Qty: 180 | Refills: 3 | Status: ACTIVE | COMMUNITY
Start: 1900-01-01 | End: 1900-01-01

## 2023-04-20 RX ORDER — RIFAXIMIN 550 MG/1
550 TABLET ORAL
Refills: 0 | Status: DISCONTINUED | COMMUNITY
End: 2023-04-20

## 2023-04-20 RX ORDER — RAMELTEON 8 MG/1
8 TABLET ORAL
Qty: 90 | Refills: 3 | Status: ACTIVE | COMMUNITY
Start: 1900-01-01 | End: 1900-01-01

## 2023-04-20 RX ORDER — AZATHIOPRINE 50 1/1
50 TABLET ORAL
Qty: 30 | Refills: 0 | Status: ACTIVE | COMMUNITY

## 2023-04-20 NOTE — DISCUSSION/SUMMARY
[EKG obtained to assist in diagnosis and management of assessed problem(s)] : EKG obtained to assist in diagnosis and management of assessed problem(s) [FreeTextEntry1] : 1.  Atrial fibrillation: Patient with tachycardia we will obtain an EKG and consider repeat 2D echo to look for effusion and tamponade.  Continue Coumadin target INR 2-3 last INR 1.2 advised to increase Coumadin to 5 mg daily.\par \par Addendum: EKG reviewed likely an atrial tachycardia versus atrial flutter.  Continue Coumadin with a target INR 2-3 unfortunately unable to adequately rate control the patient due to history of liver and renal failure as well as low blood pressure.  As patient remains asymptomatic at this time we will hold off on any cardioversion continue current regimen of medication.

## 2023-04-20 NOTE — PHYSICAL EXAM

## 2023-04-20 NOTE — HISTORY OF PRESENT ILLNESS
[FreeTextEntry1] : 68-year-old male with a past medical history of renal failure and liver failure status post liver transplant now currently awaiting liver kidney transplant.  Patient with atrial fibrillation status post ablation.  Patient recently hospitalized for pericardial effusion requiring drainage.  Since drainage heart rate was typically in the 80s blood pressure soft which is a chronic thing for him.  Overall feeling better denies chest pain shortness of breath PND orthopnea.

## 2023-05-02 ENCOUNTER — NON-APPOINTMENT (OUTPATIENT)
Age: 68
End: 2023-05-02

## 2023-05-03 ENCOUNTER — TRANSCRIPTION ENCOUNTER (OUTPATIENT)
Age: 68
End: 2023-05-03

## 2023-05-03 RX ORDER — MIDODRINE HYDROCHLORIDE 10 MG/1
10 TABLET ORAL
Qty: 270 | Refills: 1 | Status: ACTIVE | COMMUNITY
Start: 1900-01-01 | End: 1900-01-01

## 2023-05-08 ENCOUNTER — TRANSCRIPTION ENCOUNTER (OUTPATIENT)
Age: 68
End: 2023-05-08

## 2023-05-08 DIAGNOSIS — F32.A DEPRESSION, UNSPECIFIED: ICD-10-CM

## 2023-05-10 RX ORDER — TESTOSTERONE 50 MG/5G
50 MG/5GM GEL TRANSDERMAL
Qty: 90 | Refills: 0 | Status: DISCONTINUED | COMMUNITY
Start: 2022-09-01 | End: 2023-05-10

## 2023-05-10 RX ORDER — TESTOSTERONE 50 MG/5G
50 MG/5GM GEL TRANSDERMAL
Qty: 3 | Refills: 0 | Status: ACTIVE | COMMUNITY
Start: 2023-05-10 | End: 1900-01-01

## 2023-05-11 ENCOUNTER — NON-APPOINTMENT (OUTPATIENT)
Age: 68
End: 2023-05-11

## 2023-05-11 LAB — INR PPP: 2.07

## 2023-05-18 LAB — INR PPP: 2.94

## 2023-09-06 ENCOUNTER — RX RENEWAL (OUTPATIENT)
Age: 68
End: 2023-09-06

## 2023-09-06 RX ORDER — SERTRALINE HYDROCHLORIDE 50 MG/1
50 TABLET, FILM COATED ORAL
Qty: 90 | Refills: 3 | Status: ACTIVE | COMMUNITY
Start: 2023-09-06 | End: 1900-01-01